# Patient Record
Sex: MALE | Race: WHITE | NOT HISPANIC OR LATINO | URBAN - NONMETROPOLITAN AREA
[De-identification: names, ages, dates, MRNs, and addresses within clinical notes are randomized per-mention and may not be internally consistent; named-entity substitution may affect disease eponyms.]

---

## 2018-05-10 ENCOUNTER — APPOINTMENT (RX ONLY)
Dept: URBAN - NONMETROPOLITAN AREA CLINIC 28 | Facility: CLINIC | Age: 68
Setting detail: DERMATOLOGY
End: 2018-05-10

## 2018-05-10 VITALS — HEART RATE: 78 BPM | SYSTOLIC BLOOD PRESSURE: 134 MMHG | DIASTOLIC BLOOD PRESSURE: 83 MMHG

## 2018-05-10 DIAGNOSIS — L72.8 OTHER FOLLICULAR CYSTS OF THE SKIN AND SUBCUTANEOUS TISSUE: ICD-10-CM

## 2018-05-10 DIAGNOSIS — L57.0 ACTINIC KERATOSIS: ICD-10-CM

## 2018-05-10 PROBLEM — E13.9 OTHER SPECIFIED DIABETES MELLITUS WITHOUT COMPLICATIONS: Status: ACTIVE | Noted: 2018-05-10

## 2018-05-10 PROBLEM — C44.329 SQUAMOUS CELL CARCINOMA OF SKIN OF OTHER PARTS OF FACE: Status: ACTIVE | Noted: 2018-05-10

## 2018-05-10 PROBLEM — I10 ESSENTIAL (PRIMARY) HYPERTENSION: Status: ACTIVE | Noted: 2018-05-10

## 2018-05-10 PROBLEM — L85.3 XEROSIS CUTIS: Status: ACTIVE | Noted: 2018-05-10

## 2018-05-10 PROCEDURE — 11100: CPT | Mod: 59

## 2018-05-10 PROCEDURE — ? BIOPSY BY SHAVE METHOD

## 2018-05-10 PROCEDURE — ? CONSULTATION FOR MOHS SURGERY

## 2018-05-10 PROCEDURE — ? LIQUID NITROGEN

## 2018-05-10 PROCEDURE — 17000 DESTRUCT PREMALG LESION: CPT

## 2018-05-10 PROCEDURE — ? COUNSELING

## 2018-05-10 PROCEDURE — 17003 DESTRUCT PREMALG LES 2-14: CPT

## 2018-05-10 PROCEDURE — 99202 OFFICE O/P NEW SF 15 MIN: CPT | Mod: 25

## 2018-05-10 ASSESSMENT — LOCATION DETAILED DESCRIPTION DERM
LOCATION DETAILED: RIGHT CENTRAL FRONTAL SCALP
LOCATION DETAILED: LEFT SUPERIOR OCCIPITAL SCALP
LOCATION DETAILED: LEFT CENTRAL FRONTAL SCALP
LOCATION DETAILED: MID-OCCIPITAL SCALP
LOCATION DETAILED: RIGHT CENTRAL PARIETAL SCALP
LOCATION DETAILED: LEFT INFERIOR UPPER BACK
LOCATION DETAILED: RIGHT SUPERIOR FOREHEAD
LOCATION DETAILED: RIGHT INFERIOR LATERAL UPPER BACK
LOCATION DETAILED: RIGHT LATERAL FOREHEAD
LOCATION DETAILED: RIGHT CLAVICULAR SKIN

## 2018-05-10 ASSESSMENT — LOCATION SIMPLE DESCRIPTION DERM
LOCATION SIMPLE: LEFT OCCIPITAL SCALP
LOCATION SIMPLE: SCALP
LOCATION SIMPLE: LEFT UPPER BACK
LOCATION SIMPLE: RIGHT FOREHEAD
LOCATION SIMPLE: RIGHT UPPER BACK
LOCATION SIMPLE: RIGHT CLAVICULAR SKIN
LOCATION SIMPLE: LEFT SCALP
LOCATION SIMPLE: POSTERIOR SCALP
LOCATION SIMPLE: RIGHT SCALP

## 2018-05-10 ASSESSMENT — LOCATION ZONE DERM
LOCATION ZONE: TRUNK
LOCATION ZONE: SCALP
LOCATION ZONE: FACE

## 2018-05-10 NOTE — PROCEDURE: BIOPSY BY SHAVE METHOD
Consent: Verbal consent was obtained and risks were reviewed including but not limited to scarring, infection, bleeding, scabbing, incomplete removal, nerve damage and allergy to anesthesia.
Destruction After The Procedure: No
Silver Nitrate Text: The wound bed was treated with silver nitrate after the biopsy was performed.
Render Post-Care Instructions In Note?: yes
Size Of Lesion In Cm: 0.3
Biopsy Type: H and E
Hemostasis: Aluminum Chloride
Type Of Destruction Used: Curettage
Biopsy Method: 15 blade
Anesthesia Volume In Cc (Will Not Render If 0): 0.5
Post-Care Instructions: I reviewed with the patient in detail post-care instructions. Patient is to keep the biopsy site dry overnight, and then apply bacitracin twice daily until healed. Patient may apply hydrogen peroxide soaks to remove any crusting.
Wound Care: Bacitracin
Lab: [id number]
Notification Instructions: Patient will be notified of biopsy results. However, patient instructed to call the office if not contacted within 2 weeks.
Anesthesia Type: 2% lidocaine with epinephrine
Detail Level: Detailed
Cryotherapy Text: The wound bed was treated with cryotherapy after the biopsy was performed.
Curettage Text: The wound bed was treated with curettage after the biopsy was performed.
Additional Anesthesia Volume In Cc (Will Not Render If 0): 0
Lab Facility: [id number]
Electrodesiccation Text: The wound bed was treated with electrodesiccation after the biopsy was performed.
Dressing: bandage
Billing Type: Third-Party Bill
Electrodesiccation And Curettage Text: The wound bed was treated with electrodesiccation and curettage after the biopsy was performed.

## 2018-05-10 NOTE — PROCEDURE: LIQUID NITROGEN
Aperture Size (Optional): D
Post-Care Instructions: I reviewed with the patient in detail post-care instructions. Patient is to wear sunprotection, and avoid picking at any of the treated lesions. Pt may apply Vaseline to crusted or scabbing areas.
Consent: The patient's consent was obtained including but not limited to risks of crusting, scabbing, blistering, scarring, darker or lighter pigmentary change, recurrence, incomplete removal and infection.
Duration Of Freeze Thaw-Cycle (Seconds): 5
Render Post-Care Instructions In Note?: no
Detail Level: Detailed
Number Of Freeze-Thaw Cycles: 1 freeze-thaw cycle